# Patient Record
Sex: FEMALE | Race: BLACK OR AFRICAN AMERICAN | NOT HISPANIC OR LATINO | Employment: FULL TIME | URBAN - METROPOLITAN AREA
[De-identification: names, ages, dates, MRNs, and addresses within clinical notes are randomized per-mention and may not be internally consistent; named-entity substitution may affect disease eponyms.]

---

## 2018-09-20 ENCOUNTER — HOSPITAL ENCOUNTER (EMERGENCY)
Facility: HOSPITAL | Age: 43
Discharge: HOME | End: 2018-09-20
Payer: COMMERCIAL

## 2018-09-20 VITALS
RESPIRATION RATE: 17 BRPM | HEART RATE: 71 BPM | OXYGEN SATURATION: 98 % | TEMPERATURE: 99 F | HEIGHT: 67 IN | SYSTOLIC BLOOD PRESSURE: 111 MMHG | DIASTOLIC BLOOD PRESSURE: 87 MMHG | WEIGHT: 182.56 LBS | BODY MASS INDEX: 28.65 KG/M2

## 2018-09-20 DIAGNOSIS — R00.2 PALPITATIONS: Primary | ICD-10-CM

## 2018-09-20 DIAGNOSIS — I49.3 PVC'S (PREMATURE VENTRICULAR CONTRACTIONS): ICD-10-CM

## 2018-09-20 PROCEDURE — 99283 EMERGENCY DEPT VISIT LOW MDM: CPT

## 2018-09-20 PROCEDURE — 93010 ELECTROCARDIOGRAM REPORT: CPT | Mod: ,,, | Performed by: INTERNAL MEDICINE

## 2018-09-20 RX ORDER — METOPROLOL SUCCINATE 25 MG/1
12.5 TABLET, EXTENDED RELEASE ORAL DAILY
COMMUNITY

## 2018-09-21 NOTE — ED PROVIDER NOTES
SCRIBE #1 NOTE: I, Corinne Mack, am scribing for, and in the presence of, Amador Miller PA-C. I have scribed the entire note.      History      Chief Complaint   Patient presents with    Palpitations       Review of patient's allergies indicates:  No Known Allergies     HPI   HPI    9/20/2018, 7:51 PM   History obtained from the patient      History of Present Illness: Myranda Vinson is a 43 y.o. female patient with PMHx of RA and PVCs who presents to the Emergency Department for palpaitations which onset weeks ago. Pt had a Holter monitor study done a month ago and was Dx with PVCs. Pt was started on metoprolol, but states she is still having episodes of PVCs 3-4 times daily that last for several minutes at a time despite taking her medication. Pt has been taking her HR while at work and states that her highest HR was 117. Pt reports that when she has these episodes she becomes anxious which then worsens her sxs. Symptoms are episodic and moderate in severity. No mitigating factors reported. No associated sxs reported. Patient denies any diaphoresis, CP, SOB, N/V, abd pain, neck pain, back pain, HA, and all other sxs at this time. No prior Tx reported outside of the pt's daily medications. No further complaints or concerns at this time.         Arrival mode: Personal vehicle    PCP: Doni Disla MD       Past Medical History:  Past Medical History:   Diagnosis Date    PVC (premature ventricular contraction)     RA (rheumatoid arthritis) 2008       Past Surgical History:  Past Surgical History:   Procedure Laterality Date    APPENDECTOMY           Family History:  No family history on file.    Social History:  Social History     Tobacco Use    Smoking status: Never Smoker    Smokeless tobacco: Never Used   Substance and Sexual Activity    Alcohol use: No     Frequency: Never    Drug use: No    Sexual activity: Not on file       ROS   Review of Systems   Constitutional: Negative for  "chills, diaphoresis and fever.   Respiratory: Negative for cough and shortness of breath.    Cardiovascular: Positive for palpitations. Negative for chest pain and leg swelling.   Gastrointestinal: Negative for abdominal pain, diarrhea, nausea and vomiting.   Musculoskeletal: Negative for back pain, neck pain and neck stiffness.   Skin: Negative for rash and wound.   Neurological: Negative for dizziness, light-headedness, numbness and headaches.   All other systems reviewed and are negative.    Physical Exam      Initial Vitals [09/20/18 1820]   BP Pulse Resp Temp SpO2   134/84 87 18 98.9 °F (37.2 °C) 98 %      MAP       --          Physical Exam  Nursing Notes and Vital Signs Reviewed.  Constitutional: Patient is in no acute distress. Well-developed and well-nourished.  Head: Atraumatic.  Eyes: Conjunctivae are not pale. No scleral icterus.  Neck: Supple. Full ROM.   Cardiovascular: Regular rate. Regular rhythm. No murmurs, rubs, or gallops. Distal pulses are 2+ and symmetric.  Pulmonary/Chest: No respiratory distress. Clear to auscultation bilaterally. No wheezing or rales.  Abdominal: Soft. Non-distended.  Musculoskeletal: Moves all extremities. No obvious deformities. No edema. No calf tenderness.  Skin: Warm and dry.  Neurological:  Alert, awake, and appropriate.  Normal speech.  Normal gait. No acute focal neurological deficits are appreciated.      ED Course    Procedures  ED Vital Signs:  Vitals:    09/20/18 1820   BP: 134/84   Pulse: 87   Resp: 18   Temp: 98.9 °F (37.2 °C)   TempSrc: Oral   SpO2: 98%   Weight: 82.8 kg (182 lb 8.7 oz)   Height: 5' 7" (1.702 m)       Abnormal Lab Results:  Labs Reviewed - No data to display     All Lab Results:  None    Imaging Results:  Imaging Results    None          The EKG was ordered, reviewed, and independently interpreted by the ED provider.  Interpretation time: 1829  Rate: 70 BPM  Rhythm: normal sinus rhythm and short VA  Interpretation: Possible L atrial " enlargement. Nonspecific ST abnormality. No STEMI.             The Emergency Provider reviewed the vital signs and test results, which are outlined above.    ED Discussion     8:08 PM: Pt is awake, alert, and in no distress. Discussed pt dx and plan of tx. Pt states that she was not educated on what PVCs are or on their risks/complications. Explained in detail to the pt what a PVC is and why is happens. Informed the pt that PVCs are benign and that she should not become overly concerned over her elevated HR unless it becomes higher than 120. Pt expresses her understanding and states that she has more peace of mind after being educated on the subject. Gave pt all f/u and return to the ED instructions. Instructed pt to f/u with her cardiologist for further evaluation. Pt may meed medication adjustment as she is still having multiple episodes of PVCs daily despite taking her medication as prescribed. Pt advised to return to the ED if she begins to have any new or worsening sxs such has CP or an increased HR over 120. All questions and concerns were addressed at this time. Pt expresses understanding of information and instructions, and is comfortable with plan to discharge. Pt is stable for discharge.    I discussed with patient and/or family/caretaker that evaluation in the ED does not suggest any emergent or life threatening medical conditions requiring immediate intervention beyond what was provided in the ED, and I believe patient is safe for discharge.  Regardless, an unremarkable evaluation in the ED does not preclude the development or presence of a serious of life threatening condition. As such, patient was instructed to return immediately for any worsening or change in current symptoms.      ED Medication(s):  Medications - No data to display       Medication List      ASK your doctor about these medications    atorvastatin 20 MG tablet  Commonly known as:  LIPITOR     etanercept 50 mg/mL (0.98 mL) Syrg  injection  Commonly known as:  ENBREL  Inject 1 mL (50 mg total) into the skin once a week.     fluticasone 50 mcg/actuation nasal spray  Commonly known as:  FLONASE     methotrexate 10 MG tablet  Commonly known as:  TREXALL     metoprolol succinate 25 MG 24 hr tablet  Commonly known as:  TOPROL-XL            Follow-up Information     Doni Disla MD. Schedule an appointment as soon as possible for a visit in 2 days.    Specialty:  Cardiology  Why:  Follow up with primary care in the next 1-2 days for re-check.  Contact information:  [address]  [address]  [facility] [address] [phone]             Yasmin Lopes MD. Schedule an appointment as soon as possible for a visit in 2 days.    Specialty:  Cardiology  Why:  Follow up with your cardiologist in the next 1-2 days for re-evaluation and further management. Discontinue all stimulant/caffeine use.  Contact information:  [address]  [address]  [facility] [id number] [phone]             [facility]    Specialty:  Emergency Medicine  Why:  If symptoms worsen in any way.  Contact information:  [address]  [facility] [address] [phone]                   Medical Decision Making    Medical Decision Making:   Clinical Tests:   Medical Tests: Reviewed and Ordered           Scribe Attestation:   Scribe #1: I performed the above scribed service and the documentation accurately describes the services I performed. I attest to the accuracy of the note.    Attending:   Physician Attestation Statement for Scribe #1: I, Amador Miller PA-C, personally performed the services described in this documentation, as scribed by Corinne Mack, in my presence, and it is both accurate and complete.          Clinical Impression       ICD-10-CM ICD-9-CM   1. Palpitations R00.2 785.1   2. PVC's (premature ventricular contractions) I49.3 427.69       Disposition:   Disposition: Discharged  Condition:  Stable           Amador Miller PA-C  09/20/18 2018

## 2025-02-07 ENCOUNTER — OFFICE VISIT (OUTPATIENT)
Dept: RHEUMATOLOGY | Facility: CLINIC | Age: 50
End: 2025-02-07
Payer: COMMERCIAL

## 2025-02-07 ENCOUNTER — HOSPITAL ENCOUNTER (OUTPATIENT)
Dept: RADIOLOGY | Facility: HOSPITAL | Age: 50
Discharge: HOME | End: 2025-02-07
Attending: STUDENT IN AN ORGANIZED HEALTH CARE EDUCATION/TRAINING PROGRAM
Payer: COMMERCIAL

## 2025-02-07 VITALS
HEIGHT: 67 IN | BODY MASS INDEX: 27.78 KG/M2 | WEIGHT: 177 LBS | HEART RATE: 102 BPM | SYSTOLIC BLOOD PRESSURE: 127 MMHG | DIASTOLIC BLOOD PRESSURE: 85 MMHG

## 2025-02-07 DIAGNOSIS — D84.821 IMMUNODEFICIENCY DUE TO DRUG THERAPY: ICD-10-CM

## 2025-02-07 DIAGNOSIS — M05.79 RHEUMATOID ARTHRITIS INVOLVING MULTIPLE SITES WITH POSITIVE RHEUMATOID FACTOR: Primary | ICD-10-CM

## 2025-02-07 DIAGNOSIS — Z79.899 IMMUNODEFICIENCY DUE TO DRUG THERAPY: ICD-10-CM

## 2025-02-07 DIAGNOSIS — Z79.60 ENCOUNTER FOR MONITORING IMMUNOSUPPRESSIVE MEDICATION THERAPY CAUSING IMMUNODEFICIENCY: ICD-10-CM

## 2025-02-07 DIAGNOSIS — Z79.899 HIGH RISK MEDICATION USE: ICD-10-CM

## 2025-02-07 DIAGNOSIS — E55.9 VITAMIN D DEFICIENCY: ICD-10-CM

## 2025-02-07 DIAGNOSIS — D84.821 ENCOUNTER FOR MONITORING IMMUNOSUPPRESSIVE MEDICATION THERAPY CAUSING IMMUNODEFICIENCY: ICD-10-CM

## 2025-02-07 DIAGNOSIS — M05.79 RHEUMATOID ARTHRITIS INVOLVING MULTIPLE SITES WITH POSITIVE RHEUMATOID FACTOR: ICD-10-CM

## 2025-02-07 DIAGNOSIS — Z51.81 ENCOUNTER FOR MONITORING IMMUNOSUPPRESSIVE MEDICATION THERAPY CAUSING IMMUNODEFICIENCY: ICD-10-CM

## 2025-02-07 PROCEDURE — 1160F RVW MEDS BY RX/DR IN RCRD: CPT | Mod: CPTII,S$GLB,, | Performed by: STUDENT IN AN ORGANIZED HEALTH CARE EDUCATION/TRAINING PROGRAM

## 2025-02-07 PROCEDURE — 3079F DIAST BP 80-89 MM HG: CPT | Mod: CPTII,S$GLB,, | Performed by: STUDENT IN AN ORGANIZED HEALTH CARE EDUCATION/TRAINING PROGRAM

## 2025-02-07 PROCEDURE — 3074F SYST BP LT 130 MM HG: CPT | Mod: CPTII,S$GLB,, | Performed by: STUDENT IN AN ORGANIZED HEALTH CARE EDUCATION/TRAINING PROGRAM

## 2025-02-07 PROCEDURE — 3008F BODY MASS INDEX DOCD: CPT | Mod: CPTII,S$GLB,, | Performed by: STUDENT IN AN ORGANIZED HEALTH CARE EDUCATION/TRAINING PROGRAM

## 2025-02-07 PROCEDURE — 73130 X-RAY EXAM OF HAND: CPT | Mod: TC,50

## 2025-02-07 PROCEDURE — 96372 THER/PROPH/DIAG INJ SC/IM: CPT | Mod: S$GLB,,, | Performed by: STUDENT IN AN ORGANIZED HEALTH CARE EDUCATION/TRAINING PROGRAM

## 2025-02-07 PROCEDURE — 1159F MED LIST DOCD IN RCRD: CPT | Mod: CPTII,S$GLB,, | Performed by: STUDENT IN AN ORGANIZED HEALTH CARE EDUCATION/TRAINING PROGRAM

## 2025-02-07 PROCEDURE — 99205 OFFICE O/P NEW HI 60 MIN: CPT | Mod: 25,S$GLB,, | Performed by: STUDENT IN AN ORGANIZED HEALTH CARE EDUCATION/TRAINING PROGRAM

## 2025-02-07 PROCEDURE — 73130 X-RAY EXAM OF HAND: CPT | Mod: 26,,, | Performed by: RADIOLOGY

## 2025-02-07 PROCEDURE — 99999 PR PBB SHADOW E&M-NEW PATIENT-LVL IV: CPT | Mod: PBBFAC,,, | Performed by: STUDENT IN AN ORGANIZED HEALTH CARE EDUCATION/TRAINING PROGRAM

## 2025-02-07 RX ORDER — ERGOCALCIFEROL 1.25 MG/1
50000 CAPSULE ORAL
COMMUNITY
End: 2025-02-24

## 2025-02-07 RX ORDER — BETAMETHASONE SODIUM PHOSPHATE AND BETAMETHASONE ACETATE 3; 3 MG/ML; MG/ML
6 INJECTION, SUSPENSION INTRA-ARTICULAR; INTRALESIONAL; INTRAMUSCULAR; SOFT TISSUE
Status: COMPLETED | OUTPATIENT
Start: 2025-02-07 | End: 2025-02-07

## 2025-02-07 RX ORDER — ABATACEPT 125 MG/ML
125 INJECTION, SOLUTION SUBCUTANEOUS
Qty: 4 ML | Refills: 11 | Status: ACTIVE | OUTPATIENT
Start: 2025-02-07

## 2025-02-07 RX ORDER — KETOROLAC TROMETHAMINE 30 MG/ML
60 INJECTION, SOLUTION INTRAMUSCULAR; INTRAVENOUS
Status: COMPLETED | OUTPATIENT
Start: 2025-02-07 | End: 2025-02-07

## 2025-02-07 RX ADMIN — KETOROLAC TROMETHAMINE 60 MG: 30 INJECTION, SOLUTION INTRAMUSCULAR; INTRAVENOUS at 10:02

## 2025-02-07 RX ADMIN — BETAMETHASONE SODIUM PHOSPHATE AND BETAMETHASONE ACETATE 6 MG: 3; 3 INJECTION, SUSPENSION INTRA-ARTICULAR; INTRALESIONAL; INTRAMUSCULAR; SOFT TISSUE at 10:02

## 2025-02-07 NOTE — PROGRESS NOTES
Administered 1 cc Betamethasone 6mg/cc  to Right ventrogluteal. Pt tolerated well. No acute reaction noted to site. Pt instructed on S/S to report. Advised patient to wait in lobby 15 minutes after receiving injection to monitor for any reactions. Pt verbalized understanding.     Lot: [id number]  Exp: 12/04/2025              Side Effects:  appetite changes, glucose intolerance, insomnia, diaphoresis (sweating), elevated blood pressure, ecchymosis (bruising), rash, headache, injection site reaction/pain, anaphylaxis.        Administered 2 cc  Toradol 30mg/cc  to Left ventrogluteal. Pt tolerated well. No acute reaction noted to site. Pt instructed on S/S to report. Advised patient to wait in lobby 15 minutes after receiving injection to monitor for any reactions. Pt verbalized understanding.     Lot: [id number]  Exp: 10/31/2025        Side effects: anaphylaxis, diaphoresis (sweating), injection site reaction/pain, headache, hypertension, ecchymosis (bruising), constipation, abdominal pain.

## 2025-02-07 NOTE — PATIENT INSTRUCTIONS
Patient:   JULIAN MORALES            MRN: [id number]            FIN: [id number]              Age:   41 years     Sex:  FEMALE     :  76   Associated Diagnoses:   None   Author:   NEHEMIAH ALLEN     Chief Complaint   41F w/ pmhx of anemia, presents to the ER for concerns of near syncope     History of Present Illness             The patient presents with 41F w/ pmhx of anemia, presents to the ER for concerns of near syncope.  Symptoms occurred earlier today while she was at work.  She works as a .  She was sitting, and she started to feel light headed/ dizzy, also was feeling diaphoretic, and nauseated.  She had an episode of NB/NB emesis.  Felt like she may pass out, although there was no LOC.  She said she ate ok this AM for breakfast.  She  said she drinks a lot of tea, but recently hasn't been drinking as much water.  Feels weak to stand up.  Reports generalized weakness.  She said she has hx of prior UTIs, but denies dysuria/ hematuria/ increased frequency/ increased urgency.  No associated CP/ palpitations/ swelling.  No SOB/ cough/ wheezing.  No HA/ vision chage/ numbness/ tingling/ localized weakness/ facial droop/ speech change.  Denies fever or chills. No abd pain/  diarrhea.  Denies any other symptoms..       Review of Systems   Constitutional:  Weakness, No fever, No chills.    Eye:  Negative.    Ear/Nose/Mouth/Throat:  Negative.    Cardiovascular:  No chest pain, No palpitations, No peripheral edema.   Respiratory:  No shortness of breath, No cough, No wheezing.    Gastrointestinal:  Nausea, Vomiting, No diarrhea, No abdominal pain.   Genitourinary:  No dysuria, No hematuria, No urinary frequency, No urinary urgency.   Musculoskeletal:  Negative.    Integumentary:  Negative.    Hematology/Lymphatics:  Negative.    Neurologic:  dizzy/light headed, No numbness, No tingling, No headache.   Endocrine:  Negative.    Allergy/Immunologic:  Negative.    Psychiatric:  Negative.    All  Mediterranean diet.  Try high fiber whole food plant based diet with fewer animal products and sugars.  Yoga, dea chi, pilates.     other systems All other systems are negative.     Histories   Past Med History: Past Medical History   Anemia   Family History:    No family history items have been selected or recorded., Mother: heart disease, HTN  Father: No known hx heart disease/ HTN/ HLD/ or DM  Grandmother: ESRD on HD, heart disease   Procedure History:    No active procedure history items have been selected or recorded., No qualifying data available.     Social History       Tobacco: never  EtOH: rare  Illicits: none.       Health Status   Allergies:    Allergic Reactions (All)  No Known Medication Allergies, Allergies (ST)   Allergies (1) Active Reaction  No Known Medication Allergies None Documented    Current medications:    No qualifying data available  , No qualifying data available        Physical Examination   VS/Measurements       Vitals between:   05-NOV-2019 21:46:36   TO   06-NOV-2019 21:46:36                   LAST RESULT MINIMUM MAXIMUM  Temperature 36.7 36.7 36.7  Heart Rate 71 65 71  Respiratory Rate 20 12 20  NISBP           110 108 121  NIDBP           76 65 76  NIMBP           85 79 85  SpO2                    100 100 100    General:  Alert and oriented, No acute distress, A&Ox4.    Eye:  Pupils are equal, round and reactive to light, Extraocular movements are intact.   HENT:  Normocephalic, dry oral mucosa.    Neck:  Supple, Non-tender.    Respiratory:  Lungs are clear to auscultation, Respirations are non-labored, Breath sounds are equal, Symmetrical chest wall expansion.   Cardiovascular:  Normal rate, Regular rhythm, No edema.         Arterial pulses: Bilateral, Radial, Dorsalis pedis, 2+.         Capillary refill: Bilateral, Upper extremity, Lower extremity, Less than 2 seconds.   Gastrointestinal:  Soft, Non-distended.         Tenderness: Negative.         Guarding: Negative.         Rebound: Negative.         Bowel sounds: Normal.    Genitourinary:  No costovertebral angle tenderness.    Lymphatics:  No  lymphadenopathy neck, axilla, groin.    Musculoskeletal:  Normal range of motion, Normal strength, No tenderness, No swelling.   Integumentary:  Warm, Dry, Pink, no cyanosis.    Neurologic:  Alert, Oriented, No focal deficits, Cranial Nerves II-XII are grossly intact.   Cognition and Speech:  Oriented, Speech clear and coherent.    Psychiatric:  Cooperative, Appropriate mood & affect.      Review / Management   Laboratory results:       Labs between:  05-NOV-2019 19:08 to 06-NOV-2019 19:08    CBC:                 WBC  HgB  Hct  Plt  MCV  RDW   06-NOV-2019 5.9  13.4  41.0  188  91.5  12.1     DIFF:                 Seg  Neutroph//ABS  Lymph//ABS  Mono//ABS  EOS/ABS  06-NOV-2019 NOT APPLICABLE  48 // 2.8 41 // 2.4 8 // 0.5 2 // 0.1    BMP:                 Na  Cl  BUN  Glu   06-NOV-2019 140  105  14  (H) 118                              K  CO2  Cr  Ca                              3.5  22  (H) 1.10  8.6     CMP:                 AST  ALT  AlkPhos  Bili  Albumin   06-NOV-2019 20  16  47  0.4  3.9     Other Chem:             Mg  Phos  Triglycerides  GGTP  DirectBili                           1.8                          ,   Labs between:  05-NOV-2019 19:08 to 06-NOV-2019 19:08    CBC:                 WBC  HgB  Hct  Plt  MCV  RDW   06-NOV-2019 5.9  13.4  41.0  188  91.5  12.1     DIFF:                 Seg  Neutroph//ABS  Lymph//ABS  Mono//ABS  EOS/ABS  06-NOV-2019 NOT APPLICABLE  48 // 2.8 41 // 2.4 8 // 0.5 2 // 0.1    BMP:                 Na  Cl  BUN  Glu   06-NOV-2019 140  105  14  (H) 118                              K  CO2  Cr  Ca                              3.5  22  (H) 1.10  8.6     CMP:                 AST  ALT  AlkPhos  Bili  Albumin   06-NOV-2019 20  16  47  0.4  3.9     Other Chem:             Mg  Phos  Triglycerides  GGTP  DirectBili                           1.8                          No qualifying data available   .    Radiology results   Result title:  XR CHEST 1V  Result status:  Final  Verified by:   CHARANJIT, CHERYL V on 11/06/2019 18:44  IMPRESSION: No focal infiltrate or acute radiographic abnormality.       Impression and Plan   Dx and Plan:  Diagnosis     41F w/ pmhx of anemia, presents to the ER for concerns of near syncope    Dizziness; Generalized weakness; Near-Syncope  Nausea with vomiting; Possible gastroenteritis  Possible UTI  Hx of anemia  LUCAS on CKD2  -BUN/Cr 14/1.10 & GFR 72  -UA w/ questionable UTI  -SBP running in the 110 range  -pt reports hx anemia in the past, although hgb 13.4 upon admission  -pt likely dehydrated, appears dry on exam  -IVF ordered  -abx ordered, await UCx  -repeat BMP in the AM  -check echo  -orthostatics, neurochecks, tele  -monitor closely    DVT PPX: lovenox    Full code for now, confirm code status tomorrow    I certify Observation status with expected length of stay under 2 midnights for: the above medical issues..    .

## 2025-02-07 NOTE — PROGRESS NOTES
"Subjective:      Patient ID: Myranda Vinson is a 49 y.o. female.    Chief Complaint: seropositive rheumatoid arthritis    HPI:   Patient presents to establish care for rheumatoid arthritis. Has been seeing Dr. Ramirez and other Rheumatologists for years and wants to establish with a new Rheumatologist. Diagnosed with RA 2008. RF+, CCP+, erosive disease. Currently taking MTX 25 mg/wk in split dosing and folic acid 1 mg daily. Enbrel weekly. Vitamin D 50,000 IU weekly. Has been on MTX + Enbrel for years. Reports Enbrel was controlling her disease in the past but is no longer so effective. Has been in a flare for the past few weeks with right elbow swelling and tenderness. MRI right elbow yesterday shows synovitis and early erosion. Also gets rheumatoid nodules which come and go over the hands which are currently increasing. Morning stiffness is usually 30 minutes; now stiffness is all day with the current flare. Feels like work and [employer] school stress and poor eating habits have instigated the current flare. Denies skin rashes, oral ulcers, patchy alopecia, sicca symptoms, cardiopulmonary symptoms, eye inflammation, IBD, fevers, weight loss, Raynaud's. Rheumatology review of systems is otherwise negative.    Other hx:  Surgeries both wrists for tendon repair and maybe fusion.  Ankles used to bother her years ago. Now no lower extrem problems.  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 Occasional alcohol use. Works in [employer] at [employer]. In [email or web address] school.  Family Hx: Two maternal aunts have RA.      Objective:   /85 (BP Location: Left arm, Patient Position: Sitting)   Pulse 102   Ht 5' 7" (1.702 m)   Wt 80.3 kg (177 lb 0.5 oz)   BMI 27.73 kg/m²   Physical Exam  Constitutional:       General: She is not in acute distress.     Appearance: Normal appearance.   HENT:      Head: Normocephalic and atraumatic.      Mouth/Throat:      Mouth: Mucous " membranes are moist.      Pharynx: Oropharynx is clear.   Cardiovascular:      Rate and Rhythm: Normal rate and regular rhythm.   Pulmonary:      Effort: Pulmonary effort is normal.      Breath sounds: Normal breath sounds.   Abdominal:      Palpations: Abdomen is soft.      Tenderness: There is no abdominal tenderness.   Musculoskeletal:         General: Swelling and tenderness present.      Cervical back: Normal range of motion. No tenderness.   Skin:     General: Skin is warm and dry.      Comments: +early rheumatoid nodules over right 3rd and 5th DIP and left 4th PIP.  +subcutaneous mobile lump (possible rheumatoid nodule or synovial cyst) over left elbow olecranon.   Neurological:      Mental Status: She is alert and oriented to person, place, and time. Mental status is at baseline.             Assessment and Plan:     Problem List Items Addressed This Visit          Unprioritized    RA (rheumatoid arthritis) - Primary    Relevant Medications    abatacept (ORENCIA CLICKJECT) 125 mg/mL AtIn    Other Relevant Orders    MARIO Screen w/Reflex    CBC Auto Differential    Comprehensive Metabolic Panel    C-Reactive Protein    Cyclic Citrullinated Peptide Antibody, IgG    Sedimentation rate    Rheumatoid Factor    Sjogrens syndrome-A extractable nuclear antibody    Hepatitis B Core Antibody, Total    Hepatitis B Surface Antigen    Quantiferon Gold TB    X-Ray Hand Complete Bilateral     Other Visit Diagnoses       Immunodeficiency due to drug therapy        Encounter for monitoring immunosuppressive medication therapy causing immunodeficiency        High risk medication use        Vitamin D deficiency        Relevant Orders    Vitamin D            Patient presents to establish care for rheumatoid arthritis.    RF+  CCP+  Erosive disease on XR.  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    Previous medications:  Arava 3/2017-8/2018 - ineffective.  Humira - side effect, dyspnea.  Enbrel -  lost efficacy.    Up to date on Shingrix, flu. Needs PNA vax.    Currently RA is flaring with synovitis, tenderness, increasing rheumatoid nodules. Right elbow MRI yesterday shows synovitis and early erosion.    Plan:  Celestone and toradol IM injections today for the current flare.  Stop Enbrel.  Start Orencia.  Continue MTX 25 mg/wk in split dosing.  Continue folic acid 1 mg daily.  Safety and disease monitoring labs today and in 4 months.  Mediterranean diet. Yoga, dea chi, pilates, aerobic exercise.  Vitamin D deficiency. Continue vitamin D 50,000 IU weekly. Check vitamin D with labs.  Advised PNA vaccines.  Long term steroid use in the past. Menstrual periods stopped 2-3 years ago. Address DXA next visit.        High Risk Medication Monitoring encounter  Drug therapy requiring intensive monitoring for toxicity  No current medication related issues, no evidence of toxicity  Appropriate labs ordered for toxicity monitoring    Compromised immune system secondary to autoimmune disease and/or use of immunosuppressive drugs.  Monitor carefully for infections.  Advised patient to get immediate medical care if any infection arises.  Also advised strict adherence age-appropriate vaccinations and cancer screenings with PCP.    Patient advised to hold DMARD and/or biologic therapy for signs of infection or for surgery. If you are unsure what to do please call our office for instruction.[facility] [phone]        Follow up in about 4 months (around 6/7/2025).       I spent a total of 60 minutes on the day of the visit.  This includes face to face time and non-face to face time preparing to see the patient (eg, review of tests), obtaining and/or reviewing separately obtained history, documenting clinical information in the electronic or other health record, independently interpreting results and communicating results to the patient/family/caregiver, or care coordinator.      Today's visit is associated  with current or anticipated ongoing medical care related to this patient's diagnosis of rheumatoid arthritis, which is a chronic disease which will require regular follow up to manage symptoms and progression. The patient is to return to the office within a minimum of 3-6 months to review symptoms and function at that time. CPT code

## 2025-02-17 DIAGNOSIS — M05.79 RHEUMATOID ARTHRITIS INVOLVING MULTIPLE SITES WITH POSITIVE RHEUMATOID FACTOR: Primary | ICD-10-CM

## 2025-02-17 RX ORDER — ABATACEPT 125 MG/ML
125 INJECTION, SOLUTION SUBCUTANEOUS
Qty: 4 ML | Refills: 11 | Status: ACTIVE | OUTPATIENT
Start: 2025-02-17

## 2025-02-24 ENCOUNTER — RESULTS FOLLOW-UP (OUTPATIENT)
Dept: RHEUMATOLOGY | Facility: CLINIC | Age: 50
End: 2025-02-24
Payer: COMMERCIAL

## 2025-02-24 DIAGNOSIS — E55.9 VITAMIN D DEFICIENCY: Primary | ICD-10-CM

## 2025-02-24 DIAGNOSIS — R70.0 ELEVATED SED RATE: ICD-10-CM

## 2025-02-24 RX ORDER — ASPIRIN 325 MG
TABLET, DELAYED RELEASE (ENTERIC COATED) ORAL
Qty: 24 CAPSULE | Refills: 3 | Status: SHIPPED | OUTPATIENT
Start: 2025-02-24

## 2025-03-13 ENCOUNTER — PATIENT MESSAGE (OUTPATIENT)
Dept: RHEUMATOLOGY | Facility: CLINIC | Age: 50
End: 2025-03-13
Payer: COMMERCIAL

## 2025-03-14 ENCOUNTER — LAB VISIT (OUTPATIENT)
Dept: LAB | Facility: HOSPITAL | Age: 50
End: 2025-03-14
Payer: COMMERCIAL

## 2025-03-14 DIAGNOSIS — R70.0 ELEVATED SED RATE: ICD-10-CM

## 2025-03-14 PROCEDURE — 86334 IMMUNOFIX E-PHORESIS SERUM: CPT | Mod: 26,,, | Performed by: PATHOLOGY

## 2025-03-14 PROCEDURE — 36415 COLL VENOUS BLD VENIPUNCTURE: CPT | Performed by: STUDENT IN AN ORGANIZED HEALTH CARE EDUCATION/TRAINING PROGRAM

## 2025-03-14 PROCEDURE — 84165 PROTEIN E-PHORESIS SERUM: CPT | Performed by: STUDENT IN AN ORGANIZED HEALTH CARE EDUCATION/TRAINING PROGRAM

## 2025-03-14 PROCEDURE — 84165 PROTEIN E-PHORESIS SERUM: CPT | Mod: 26,,, | Performed by: PATHOLOGY

## 2025-03-14 PROCEDURE — 83521 IG LIGHT CHAINS FREE EACH: CPT | Mod: 59 | Performed by: STUDENT IN AN ORGANIZED HEALTH CARE EDUCATION/TRAINING PROGRAM

## 2025-03-14 PROCEDURE — 86334 IMMUNOFIX E-PHORESIS SERUM: CPT | Performed by: STUDENT IN AN ORGANIZED HEALTH CARE EDUCATION/TRAINING PROGRAM

## 2025-03-17 LAB
ALBUMIN SERPL ELPH-MCNC: 3.84 G/DL (ref 3.35–5.55)
ALPHA1 GLOB SERPL ELPH-MCNC: 0.39 G/DL (ref 0.17–0.41)
ALPHA2 GLOB SERPL ELPH-MCNC: 0.97 G/DL (ref 0.43–0.99)
B-GLOBULIN SERPL ELPH-MCNC: 1.18 G/DL (ref 0.5–1.1)
GAMMA GLOB SERPL ELPH-MCNC: 1.83 G/DL (ref 0.67–1.58)
INTERPRETATION SERPL IFE-IMP: NORMAL
KAPPA LC SER QL IA: 3.41 MG/DL (ref 0.33–1.94)
KAPPA LC/LAMBDA SER IA: 1.51 (ref 0.26–1.65)
LAMBDA LC SER QL IA: 2.26 MG/DL (ref 0.57–2.63)
PROT SERPL-MCNC: 8.2 G/DL (ref 6–8.4)

## 2025-03-18 ENCOUNTER — PATIENT MESSAGE (OUTPATIENT)
Dept: RHEUMATOLOGY | Facility: CLINIC | Age: 50
End: 2025-03-18
Payer: COMMERCIAL

## 2025-03-18 DIAGNOSIS — M05.79 RHEUMATOID ARTHRITIS INVOLVING MULTIPLE SITES WITH POSITIVE RHEUMATOID FACTOR: Primary | ICD-10-CM

## 2025-03-19 LAB
PATHOLOGIST INTERPRETATION IFE: NORMAL
PATHOLOGIST INTERPRETATION SPE: NORMAL

## 2025-04-03 ENCOUNTER — PATIENT MESSAGE (OUTPATIENT)
Dept: RHEUMATOLOGY | Facility: CLINIC | Age: 50
End: 2025-04-03
Payer: COMMERCIAL

## 2025-04-03 ENCOUNTER — RESULTS FOLLOW-UP (OUTPATIENT)
Dept: RHEUMATOLOGY | Facility: CLINIC | Age: 50
End: 2025-04-03

## 2025-04-03 DIAGNOSIS — M05.79 RHEUMATOID ARTHRITIS INVOLVING MULTIPLE SITES WITH POSITIVE RHEUMATOID FACTOR: Primary | ICD-10-CM

## 2025-04-03 RX ORDER — METHOTREXATE 2.5 MG/1
25 TABLET ORAL
Qty: 120 TABLET | Refills: 1 | Status: SHIPPED | OUTPATIENT
Start: 2025-04-03

## 2025-04-03 RX ORDER — FOLIC ACID 1 MG/1
1 TABLET ORAL DAILY
Qty: 90 TABLET | Refills: 3 | Status: SHIPPED | OUTPATIENT
Start: 2025-04-03

## 2025-04-03 RX ORDER — METHYLPREDNISOLONE 4 MG/1
TABLET ORAL
Qty: 21 EACH | Refills: 0 | Status: SHIPPED | OUTPATIENT
Start: 2025-04-03 | End: 2025-04-24

## 2025-04-03 RX ORDER — FUROSEMIDE 20 MG/1
20 TABLET ORAL DAILY
Qty: 5 TABLET | Refills: 0 | Status: SHIPPED | OUTPATIENT
Start: 2025-04-03 | End: 2025-04-08

## 2025-04-24 ENCOUNTER — TELEPHONE (OUTPATIENT)
Dept: RHEUMATOLOGY | Facility: CLINIC | Age: 50
End: 2025-04-24
Payer: COMMERCIAL

## 2025-04-24 NOTE — TELEPHONE ENCOUNTER
Called patient to inform them that  is leaving and they would need to find new rheumatologist. Patient said she was already transferred

## 2025-05-01 ENCOUNTER — PATIENT MESSAGE (OUTPATIENT)
Dept: ADMINISTRATIVE | Facility: OTHER | Age: 50
End: 2025-05-01
Payer: COMMERCIAL

## 2025-07-16 ENCOUNTER — LAB VISIT (OUTPATIENT)
Dept: LAB | Facility: HOSPITAL | Age: 50
End: 2025-07-16
Attending: STUDENT IN AN ORGANIZED HEALTH CARE EDUCATION/TRAINING PROGRAM
Payer: COMMERCIAL

## 2025-07-16 DIAGNOSIS — M05.79 RHEUMATOID ARTHRITIS INVOLVING MULTIPLE SITES WITH POSITIVE RHEUMATOID FACTOR: ICD-10-CM

## 2025-07-16 LAB
ABSOLUTE EOSINOPHIL (OHS): 0.03 K/UL
ABSOLUTE MONOCYTE (OHS): 0.89 K/UL (ref 0.3–1)
ABSOLUTE NEUTROPHIL COUNT (OHS): 2.79 K/UL (ref 1.8–7.7)
BASOPHILS # BLD AUTO: 0.03 K/UL
BASOPHILS NFR BLD AUTO: 0.5 %
ERYTHROCYTE [DISTWIDTH] IN BLOOD BY AUTOMATED COUNT: 15.6 % (ref 11.5–14.5)
ERYTHROCYTE [SEDIMENTATION RATE] IN BLOOD BY PHOTOMETRIC METHOD: 91 MM/HR
HCT VFR BLD AUTO: 35.8 % (ref 37–48.5)
HGB BLD-MCNC: 11 GM/DL (ref 12–16)
IMM GRANULOCYTES # BLD AUTO: 0.01 K/UL (ref 0–0.04)
IMM GRANULOCYTES NFR BLD AUTO: 0.2 % (ref 0–0.5)
LYMPHOCYTES # BLD AUTO: 1.96 K/UL (ref 1–4.8)
MCH RBC QN AUTO: 24.8 PG (ref 27–31)
MCHC RBC AUTO-ENTMCNC: 30.7 G/DL (ref 32–36)
MCV RBC AUTO: 81 FL (ref 82–98)
NUCLEATED RBC (/100WBC) (OHS): 0 /100 WBC
PLATELET # BLD AUTO: 322 K/UL (ref 150–450)
PMV BLD AUTO: 10.4 FL (ref 9.2–12.9)
RBC # BLD AUTO: 4.44 M/UL (ref 4–5.4)
RELATIVE EOSINOPHIL (OHS): 0.5 %
RELATIVE LYMPHOCYTE (OHS): 34.3 % (ref 18–48)
RELATIVE MONOCYTE (OHS): 15.6 % (ref 4–15)
RELATIVE NEUTROPHIL (OHS): 48.9 % (ref 38–73)
WBC # BLD AUTO: 5.71 K/UL (ref 3.9–12.7)

## 2025-07-16 PROCEDURE — 36415 COLL VENOUS BLD VENIPUNCTURE: CPT

## 2025-07-16 PROCEDURE — 86140 C-REACTIVE PROTEIN: CPT

## 2025-07-16 PROCEDURE — 85652 RBC SED RATE AUTOMATED: CPT

## 2025-07-16 PROCEDURE — 85025 COMPLETE CBC W/AUTO DIFF WBC: CPT

## 2025-07-16 PROCEDURE — 80053 COMPREHEN METABOLIC PANEL: CPT

## 2025-07-17 LAB
ALBUMIN SERPL BCP-MCNC: 3.7 G/DL (ref 3.5–5.2)
ALP SERPL-CCNC: 75 UNIT/L (ref 40–150)
ALT SERPL W/O P-5'-P-CCNC: 17 UNIT/L (ref 10–44)
ANION GAP (OHS): 7 MMOL/L (ref 8–16)
AST SERPL-CCNC: 20 UNIT/L (ref 11–45)
BILIRUB SERPL-MCNC: 0.2 MG/DL (ref 0.1–1)
BUN SERPL-MCNC: 12 MG/DL (ref 6–20)
CALCIUM SERPL-MCNC: 8.9 MG/DL (ref 8.7–10.5)
CHLORIDE SERPL-SCNC: 105 MMOL/L (ref 95–110)
CO2 SERPL-SCNC: 23 MMOL/L (ref 23–29)
CREAT SERPL-MCNC: 0.9 MG/DL (ref 0.5–1.4)
CRP SERPL-MCNC: 5.9 MG/L
GFR SERPLBLD CREATININE-BSD FMLA CKD-EPI: >60 ML/MIN/1.73/M2
GLUCOSE SERPL-MCNC: 84 MG/DL (ref 70–110)
POTASSIUM SERPL-SCNC: 4.2 MMOL/L (ref 3.5–5.1)
PROT SERPL-MCNC: 7.7 GM/DL (ref 6–8.4)
SODIUM SERPL-SCNC: 135 MMOL/L (ref 136–145)

## 2025-07-25 ENCOUNTER — OFFICE VISIT (OUTPATIENT)
Dept: RHEUMATOLOGY | Facility: CLINIC | Age: 50
End: 2025-07-25
Payer: COMMERCIAL

## 2025-07-25 DIAGNOSIS — M05.79 RHEUMATOID ARTHRITIS INVOLVING MULTIPLE SITES WITH POSITIVE RHEUMATOID FACTOR: Primary | ICD-10-CM

## 2025-07-25 DIAGNOSIS — Z79.60 ENCOUNTER FOR MONITORING IMMUNOSUPPRESSIVE MEDICATION THERAPY CAUSING IMMUNODEFICIENCY: ICD-10-CM

## 2025-07-25 DIAGNOSIS — Z79.899 HIGH RISK MEDICATION USE: ICD-10-CM

## 2025-07-25 DIAGNOSIS — D84.821 IMMUNODEFICIENCY DUE TO DRUG THERAPY: ICD-10-CM

## 2025-07-25 DIAGNOSIS — D84.821 ENCOUNTER FOR MONITORING IMMUNOSUPPRESSIVE MEDICATION THERAPY CAUSING IMMUNODEFICIENCY: ICD-10-CM

## 2025-07-25 DIAGNOSIS — Z79.899 IMMUNODEFICIENCY DUE TO DRUG THERAPY: ICD-10-CM

## 2025-07-25 DIAGNOSIS — R60.0 BILATERAL LOWER EXTREMITY EDEMA: ICD-10-CM

## 2025-07-25 DIAGNOSIS — Z51.81 ENCOUNTER FOR MONITORING IMMUNOSUPPRESSIVE MEDICATION THERAPY CAUSING IMMUNODEFICIENCY: ICD-10-CM

## 2025-07-25 RX ORDER — METHYLPREDNISOLONE 4 MG/1
TABLET ORAL
Qty: 21 EACH | Refills: 0 | Status: SHIPPED | OUTPATIENT
Start: 2025-07-25 | End: 2025-08-15

## 2025-07-25 RX ORDER — FUROSEMIDE 20 MG/1
20 TABLET ORAL DAILY
Qty: 14 TABLET | Refills: 0 | Status: SHIPPED | OUTPATIENT
Start: 2025-07-25 | End: 2025-08-08

## 2025-07-25 NOTE — PROGRESS NOTES
The patient location is: [place]  The chief complaint leading to consultation is: rheumatoid arthritis    Visit type: audiovisual    Face to Face time with patient: 12 minutes  20 minutes of total time spent on the encounter, which includes face to face time and non-face to face time preparing to see the patient (eg, review of tests), Obtaining and/or reviewing separately obtained history, Documenting clinical information in the electronic or other health record, Independently interpreting results (not separately reported) and communicating results to the patient/family/caregiver, or Care coordination (not separately reported).         Each patient to whom he or she provides medical services by telemedicine is:  (1) informed of the relationship between the physician and patient and the respective role of any other health care provider with respect to management of the patient; and (2) notified that he or she may decline to receive medical services by telemedicine and may withdraw from such care at any time.    Notes:       Subjective:      Patient ID: Myranda Vinson is a 50 y.o. female.    Chief Complaint: rheumatoid arthritis    HPI:   Patient presents for Rheumatology follow up for rheumatoid arthritis. Diagnosed with RA 2008. RF+, CCP+, erosive disease. Currently taking Orencia weekly injections (started 2/2025), MTX 25 mg/wk in split dosing and folic acid 1 mg daily. Vitamin D 50,000 IU weekly. Gets rheumatoid nodules which come and go over the hands. Today she reports a flare of RA with tender and swollen joints in the hands. She attributes it to stress about final exams at school. This is the first flare up since we started Orencia in February. She feels that Orencia has really helped. Also has been having pitting edema in the past few months. Has a new rheumatoid nodule on left elbow. Denies significant stiffness, skin rashes, oral ulcers, patchy alopecia, sicca symptoms, cardiopulmonary symptoms,  eye inflammation, IBD, fevers, weight loss, Raynaud's. Rheumatology review of systems is otherwise negative.    Other hx:  Surgeries both wrists for tendon repair and maybe fusion.  Ankles used to bother her years ago. Now no lower extrem problems.  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 Occasional alcohol use. Works in [employer] at [employer]. In [employer].  Family Hx: Two maternal aunts have RA.      Objective:   There were no vitals taken for this visit.  Physical Exam  Constitutional:       General: She is not in acute distress.     Appearance: Normal appearance.   Musculoskeletal:         General: Swelling present.      Right lower leg: Edema present.      Left lower leg: Edema present.               Assessment and Plan:     Problem List Items Addressed This Visit          Unprioritized    RA (rheumatoid arthritis) - Primary    Relevant Orders    CBC Auto Differential    Comprehensive Metabolic Panel    C-Reactive Protein    Sedimentation rate     Other Visit Diagnoses         Bilateral lower extremity edema        Relevant Orders    Urinalysis    Protein/Creatinine Ratio, Urine      Immunodeficiency due to drug therapy          Encounter for monitoring immunosuppressive medication therapy causing immunodeficiency          High risk medication use        Relevant Orders    CBC Auto Differential    Comprehensive Metabolic Panel    C-Reactive Protein    Sedimentation rate          X-Ray Hand Complete Bilateral  Order: [id number]   Status: Final result       Next appt: 11/13/2025 at 02:15 PM in Dermatology (Loren Medina MD)       Dx: Rheumatoid arthritis involving multip...    Test Result Released: Yes (seen)    0 Result Notes  Details    Reading Physician Reading Date Result Priority   Saulo Joseph DO  [phone]  2/7/2025 Routine     Narrative & Impression  EXAMINATION:  XR HAND COMPLETE 3 VIEWS BILATERAL     CLINICAL HISTORY:  . Rheumatoid  arthritis with rheumatoid factor of multiple sites without organ or systems involvement     TECHNIQUE:  PA, lateral, and oblique views of both hands were performed.     COMPARISON:  10/20/2014     FINDINGS:  There are extensive erosive changes seen involving both wrists which have progressed significantly when compared to the study and involve essentially all the carpal bones bilaterally and significant involvement of either ulna and also the bases of multiple metacarpals bilaterally.  No obvious erosive changes seen involving the MCP joints are PIP joints.     Impression:     As above        Electronically signed by:Saulo Joseph DO  Date:                                            02/07/2025  Time:                                           11:12       All of the data above and below has been reviewed by myself and any further interpretations will be reflected in the assessment and plan.   The data includes review of external notes, and independent interpretation of lab results, x-rays, and imaging reports.    Patient presents for Rheumatology follow up for rheumatoid arthritis.    RF+  CCP+  Erosive disease on XR.  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    Previous medications:  Arava 3/2017-8/2018 - ineffective.  Humira - side effect, dyspnea.  Enbrel - lost efficacy after years of successful use.    Up to date on Shingrix, flu. Needs PNA vax.      Flare of RA today with tender and swollen joints. Likely related to stress about final exams at school. First flare up since we started Orencia in February. Therefore okay to treat with steroid and continue same meds. If flares become more frequent, consider changing DMARD. Also has been having pitting edema in the past few months.    Reviewed labs for this visit: mild anemia on CBC and elevated ESR likely 2/2 current flare. Stable CMP and normal CRP.  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    Reviewed XR hand images showing severe  erosive changes kimber both wrists.      Plan:  Continue Orencia weekly inj  Continue MTX 25 mg/wk in split dosing.  Continue folic acid 1 mg daily.  Medrol dosepack for current RA flare.  Lasix 20 mg daily PRN for BLE edema until she can see her PCP for workup.  Safety and disease monitoring labs in 3-4 months with follow up.      High Risk Medication Monitoring encounter  Drug therapy requiring intensive monitoring for toxicity  No current medication related issues, no evidence of toxicity  Appropriate labs ordered for toxicity monitoring    Compromised immune system secondary to autoimmune disease and/or use of immunosuppressive drugs.  Monitor carefully for infections.  Advised patient to get immediate medical care if any infection arises.  Also advised strict adherence age-appropriate vaccinations and cancer screenings with PCP.    Patient advised to hold DMARD and/or biologic therapy for signs of infection or for surgery. If you are unsure what to do please call our office for instruction.[facility] [phone]        Follow up in about 4 months (around 11/25/2025).     Today's visit is associated with current or anticipated ongoing medical care related to this patient's diagnosis of rheumatoid arthritis, which is a chronic disease which will require regular follow up to manage symptoms and progression. The patient is to return to the office within a minimum of 3-6 months to review symptoms and function at that time. CPT code